# Patient Record
Sex: FEMALE | Race: WHITE | NOT HISPANIC OR LATINO | Employment: UNEMPLOYED
[De-identification: names, ages, dates, MRNs, and addresses within clinical notes are randomized per-mention and may not be internally consistent; named-entity substitution may affect disease eponyms.]

---

## 2020-07-16 ENCOUNTER — TRANSCRIBE ORDERS (OUTPATIENT)
Dept: OTHER | Age: 55
End: 2020-07-16

## 2020-07-16 DIAGNOSIS — K76.0 NAFLD (NONALCOHOLIC FATTY LIVER DISEASE): Primary | ICD-10-CM

## 2020-08-04 NOTE — TELEPHONE ENCOUNTER
RECORDS RECEIVED FROM: for NAFLD (nonalcoholic fatty liver disease   Appt Date: 08.12.2020   NOTES STATUS DETAILS   OFFICE NOTE from referring provider Care Everywhere 07.09.2020 Jessica Arango MD     OFFICE NOTES from other specialists N/A    DISCHARGE SUMMARY from hospital N/A    MEDICATION LIST Care Everywhere    LIVER BIOSPY (IF APPLICABLE)      PATHOLOGY REPORTS  N/A    IMAGING     ENDOSCOPY (IF AVAILABLE) N/A    COLONOSCOPY (IF AVAILABLE) N/A    ULTRASOUND LIVER Care Everywhere  09.16.2019  US Abd Limited   CT OF ABDOMEN Care Everywhere 09.16.2019  CT Abd Pelvis   MRI OF LIVER Care Everywhere  07.29.2020 MR Abd Liver   FIBROSCAN, US ELASTOGRAPHY, FIBROSIS SCAN, MR ELASTOGRAPHY N/A    LABS     HEPATIC PANEL (LIVER PANEL) Care Everywhere 09.16.2019   BASIC METABOLIC PANEL Care Everywhere 09.16.2019   COMPLETE METABOLIC PANEL Care Everywhere 07.16.2020   COMPLETE BLOOD COUNT (CBC) Care Everywhere 07.16.2020   INTERNATIONAL NORMALIZED RATIO (INR) N/A    HEPATITIS C ANTIBODY N/A    HEPATITIS C VIRAL LOAD/PCR N/A    HEPATITIS C GENOTYPE N/A    HEPATITIS B SURFACE ANTIGEN N/A    HEPATITIS B SURFACE ANTIBODY N/A    HEPATITIS B DNA QUANT LEVEL N/A    HEPATITIS B CORE ANTIBODY N/A      Action 08.04.2020 RM   Action Taken Called Gurinder to get images pushed called [phone] lv for images. Pending for images.    08.07.2020 Called Gurinder at [phone] lvm for images.    08.10.2020 Called Gurinder at [phone] lvm to get images pushed.    08.11.2020 All images received and resolved to chart.

## 2020-08-10 ENCOUNTER — DOCUMENTATION ONLY (OUTPATIENT)
Dept: CARE COORDINATION | Facility: CLINIC | Age: 55
End: 2020-08-10

## 2020-08-11 ENCOUNTER — TELEPHONE (OUTPATIENT)
Dept: GASTROENTEROLOGY | Facility: CLINIC | Age: 55
End: 2020-08-11

## 2020-08-12 ENCOUNTER — PRE VISIT (OUTPATIENT)
Dept: GASTROENTEROLOGY | Facility: CLINIC | Age: 55
End: 2020-08-12

## 2023-05-12 ENCOUNTER — TRANSFERRED RECORDS (OUTPATIENT)
Dept: HEALTH INFORMATION MANAGEMENT | Facility: CLINIC | Age: 58
End: 2023-05-12

## 2023-06-18 ENCOUNTER — APPOINTMENT (OUTPATIENT)
Dept: GENERAL RADIOLOGY | Facility: CLINIC | Age: 58
End: 2023-06-18
Attending: FAMILY MEDICINE
Payer: MEDICAID

## 2023-06-18 ENCOUNTER — APPOINTMENT (OUTPATIENT)
Dept: ULTRASOUND IMAGING | Facility: CLINIC | Age: 58
End: 2023-06-18
Attending: FAMILY MEDICINE
Payer: MEDICAID

## 2023-06-18 ENCOUNTER — HOSPITAL ENCOUNTER (EMERGENCY)
Facility: CLINIC | Age: 58
Discharge: HOME | End: 2023-06-18
Attending: FAMILY MEDICINE | Admitting: FAMILY MEDICINE
Payer: MEDICAID

## 2023-06-18 VITALS
SYSTOLIC BLOOD PRESSURE: 135 MMHG | HEART RATE: 67 BPM | DIASTOLIC BLOOD PRESSURE: 82 MMHG | OXYGEN SATURATION: 92 % | RESPIRATION RATE: 20 BRPM | TEMPERATURE: 98.8 F

## 2023-06-18 DIAGNOSIS — M79.605 PAIN OF LEFT LOWER EXTREMITY: ICD-10-CM

## 2023-06-18 PROBLEM — F32.A DEPRESSION: Status: ACTIVE | Noted: 2023-06-18

## 2023-06-18 PROBLEM — E11.9 TYPE 2 DIABETES MELLITUS WITHOUT COMPLICATION, WITHOUT LONG-TERM CURRENT USE OF INSULIN (H): Status: ACTIVE | Noted: 2020-11-09

## 2023-06-18 PROBLEM — J44.9 COPD (CHRONIC OBSTRUCTIVE PULMONARY DISEASE) (H): Status: ACTIVE | Noted: 2019-11-25

## 2023-06-18 PROBLEM — I10 ESSENTIAL HYPERTENSION: Status: ACTIVE | Noted: 2017-06-05

## 2023-06-18 PROBLEM — E78.2 MIXED HYPERLIPIDEMIA: Status: ACTIVE | Noted: 2020-11-09

## 2023-06-18 PROBLEM — M79.7 FIBROMYALGIA: Status: ACTIVE | Noted: 2017-09-14

## 2023-06-18 LAB
HOLD SPECIMEN: NORMAL

## 2023-06-18 PROCEDURE — 93971 EXTREMITY STUDY: CPT | Mod: LT

## 2023-06-18 PROCEDURE — 250N000011 HC RX IP 250 OP 636: Performed by: FAMILY MEDICINE

## 2023-06-18 PROCEDURE — 73502 X-RAY EXAM HIP UNI 2-3 VIEWS: CPT

## 2023-06-18 PROCEDURE — 99284 EMERGENCY DEPT VISIT MOD MDM: CPT | Performed by: FAMILY MEDICINE

## 2023-06-18 PROCEDURE — 96375 TX/PRO/DX INJ NEW DRUG ADDON: CPT | Performed by: FAMILY MEDICINE

## 2023-06-18 PROCEDURE — 73552 X-RAY EXAM OF FEMUR 2/>: CPT | Mod: LT

## 2023-06-18 PROCEDURE — 96374 THER/PROPH/DIAG INJ IV PUSH: CPT | Performed by: FAMILY MEDICINE

## 2023-06-18 PROCEDURE — 73562 X-RAY EXAM OF KNEE 3: CPT | Mod: LT

## 2023-06-18 PROCEDURE — 99285 EMERGENCY DEPT VISIT HI MDM: CPT | Mod: 25 | Performed by: FAMILY MEDICINE

## 2023-06-18 RX ORDER — OXYCODONE HYDROCHLORIDE 5 MG/1
5 TABLET ORAL EVERY 6 HOURS PRN
Qty: 12 TABLET | Refills: 0 | Status: SHIPPED | OUTPATIENT
Start: 2023-06-18 | End: 2023-06-21

## 2023-06-18 RX ORDER — KETOROLAC TROMETHAMINE 15 MG/ML
15 INJECTION, SOLUTION INTRAMUSCULAR; INTRAVENOUS ONCE
Status: COMPLETED | OUTPATIENT
Start: 2023-06-18 | End: 2023-06-18

## 2023-06-18 RX ORDER — HYDROMORPHONE HYDROCHLORIDE 1 MG/ML
0.5 INJECTION, SOLUTION INTRAMUSCULAR; INTRAVENOUS; SUBCUTANEOUS
Status: DISCONTINUED | OUTPATIENT
Start: 2023-06-18 | End: 2023-06-18 | Stop reason: HOSPADM

## 2023-06-18 RX ORDER — ONDANSETRON 2 MG/ML
4 INJECTION INTRAMUSCULAR; INTRAVENOUS ONCE
Status: COMPLETED | OUTPATIENT
Start: 2023-06-18 | End: 2023-06-18

## 2023-06-18 RX ADMIN — ONDANSETRON 4 MG: 2 INJECTION INTRAMUSCULAR; INTRAVENOUS at 08:24

## 2023-06-18 RX ADMIN — HYDROMORPHONE HYDROCHLORIDE 0.5 MG: 1 INJECTION, SOLUTION INTRAMUSCULAR; INTRAVENOUS; SUBCUTANEOUS at 08:25

## 2023-06-18 RX ADMIN — KETOROLAC TROMETHAMINE 15 MG: 15 INJECTION, SOLUTION INTRAMUSCULAR; INTRAVENOUS at 08:21

## 2023-06-18 ASSESSMENT — ACTIVITIES OF DAILY LIVING (ADL)
ADLS_ACUITY_SCORE: 35
ADLS_ACUITY_SCORE: 33
ADLS_ACUITY_SCORE: 35

## 2023-06-18 NOTE — ED TRIAGE NOTES
Pt presents for eval of left leg pain that started last night.  Pt states that it hurts from the back left side of her knee down to her foot but occasionally pain goes up in to her buttock. No blood thinners, no history of long travel.     Triage Assessment     Row Name 06/18/23 0697       Triage Assessment (Adult)    Airway WDL WDL       Respiratory WDL    Respiratory WDL WDL       Skin Circulation/Temperature WDL    Skin Circulation/Temperature WDL WDL       Cardiac WDL    Cardiac WDL WDL       Peripheral/Neurovascular WDL    Peripheral Neurovascular WDL WDL       Cognitive/Neuro/Behavioral WDL    Cognitive/Neuro/Behavioral WDL WDL

## 2023-06-18 NOTE — ED PROVIDER NOTES
History     Chief Complaint   Patient presents with     Leg Pain     HPI  Lyndsay Coleman is a 57 year old female, past medical history is significant for ADHD, COPD, depression, hypertension, fibromyalgia, PTSD, urolithiasis, mixed hyperlipidemia, obesity, type 2 diabetes, vitamin D deficiency, presents to the emergency department with concerns of excruciating left leg pain beginning around her knee that awoke her from sleep at 2:00 in the morning.  The patient can think of nothing that she has done to injure herself lately and denies any trauma lifting straining bending falling or almost falling.  She states that she tried ice Tylenol and ibuprofen before coming in with approximately 6 hours of symptom duration at this point.  No paresthesias.  It is difficult for her to identify exactly where the pain is coming from but it seems to have started in the knee and any movement at the knee or movement of the hip seems to make it worse and the pain radiates from the knee area down into the calf and then up into the hip area.  She has never had pain like this before.    Allergies:  Allergies   Allergen Reactions     Sulfa Antibiotics Hives       Problem List:    Patient Active Problem List    Diagnosis Date Noted     Depression 06/18/2023     Priority: Medium     Mixed hyperlipidemia 11/09/2020     Priority: Medium     Type 2 diabetes mellitus without complication, without long-term current use of insulin (H) 11/09/2020     Priority: Medium     COPD (chronic obstructive pulmonary disease) (H) 11/25/2019     Priority: Medium     Fibromyalgia 09/14/2017     Priority: Medium     Essential hypertension 06/05/2017     Priority: Medium     ADHD (attention deficit hyperactivity disorder) 09/19/2016     Priority: Medium     PTSD (post-traumatic stress disorder) 09/19/2016     Priority: Medium     Kidney stones 10/06/2014     Priority: Medium     Obesity 02/25/2014     Priority: Medium     Vitamin D deficiency 02/25/2014      Priority: Medium        Past Medical History:    No past medical history on file.    Past Surgical History:    No past surgical history on file.    Family History:    No family history on file.    Social History:  Marital Status:  Single [1]        Medications:    No current outpatient medications on file.        Review of Systems   All other systems reviewed and are negative.      Physical Exam   BP: (!) 161/92  Pulse: 73  Temp: 98.8  F (37.1  C)  Resp: 20  SpO2: 97 %      Physical Exam  Vitals and nursing note reviewed.   Constitutional:       General: She is in acute distress.      Appearance: Normal appearance. She is not ill-appearing.   Musculoskeletal:      Comments: No gross asymmetry to the lower extremities.  Comfortable with internal/external rotation, limited flexion and extension primarily pain in the leg biceps at the hip but any attempted movement at the knee beyond about 10 to 15 degrees of flexion she has exquisite discomfort and pulls away from the examiner.  On direct palpation about the knee there is no tenderness there is no tenderness to popliteal space, minimal calf tenderness, Homans' sign is positive.  Also minimal tenderness through the leg biceps.  No back tenderness on exam   Skin:     General: Skin is warm and dry.      Capillary Refill: Capillary refill takes less than 2 seconds.   Neurological:      General: No focal deficit present.      Mental Status: She is alert and oriented to person, place, and time.         ED Course     EXAM: XR KNEE LEFT 3 VIEWS  LOCATION: [facility]  DATE: 6/18/2023     INDICATION: Excruciating pain left knee, atraumatic.  COMPARISON: None.                                                                      IMPRESSION: Mild tricompartmental osteoarthrosis. Small effusion. There is no evidence of fracture or calcified intra-articular body.  EXAM: XR HIP LEFT 2-3 VIEWS  LOCATION: [facility]  DATE:  06/18/2023     INDICATION: Left thigh and hip pain, atraumatic.  COMPARISON: None.                                                                      IMPRESSION: Degenerative changes in the visualized spine. Mild osteoarthrosis of the right SI joint and left hip and pubic symphysis. There is no evidence of fracture or osteonecrosis.   EXAM: XR FEMUR LEFT 2 VIEWS  LOCATION: [facility]  DATE: 6/18/2023     INDICATION: Left thigh pain, atraumatic.  COMPARISON: None.                                                                      IMPRESSION: Mild osteoarthrosis of the left hip, the pubic symphysis and the left SI joint. There is no evidence of fracture or osteonecrosis. Mild tricompartmental osteoarthrosis in the knee. Moderate knee joint effusion.    Narrative & Impression  EXAM: ULTRASOUND LOWER EXTREMITY VENOUS DUPLEX LEFT  LOCATION: [facility]  DATE: 06/18/2023     INDICATION: Calf, popliteal space, thigh pain. Evaluate for Baker's cyst.  DVT.  COMPARISON: None available.  TECHNIQUE: Venous Duplex ultrasound of the left lower extremity with and without compression, augmentation and duplex. Color flow and spectral Doppler with waveform analysis performed.     FINDINGS: Exam includes the common femoral, femoral, popliteal, and contralateral common femoral veins as well as segmentally visualized deep calf veins and greater saphenous vein.      LEFT: No deep vein thrombosis. No superficial thrombophlebitis. No popliteal cyst.                                                                      IMPRESSION:  1.  No deep venous thrombosis in the left lower extremity.     All diagnostics were reviewed in the room with the patient and answered her questions.  She is more comfortable after the Toradol and Zofran.  I explained to the patient that the behavior of her discomfort is most consistent with musculoskeletal injury of some kind although she can recall  really nothing, there is some physical deconditioning present, I have recommended symptomatic supportive care with cold alternating with warm pack to the area of concern, rest, elevation, Tylenol/ibuprofen as needed and have given her a prescription for oxycodone for breakthrough pain.  If this is not improving over the course the next 3 to 5 days she should follow-up in clinic with her primary care provider.  Return to the emergency department if worse or changes.      Procedures                  No results found for this or any previous visit (from the past 24 hour(s)).    Medications   ondansetron (ZOFRAN) injection 4 mg (4 mg Intravenous $Given 6/18/23 0809)   ketorolac (TORADOL) injection 15 mg (15 mg Intravenous $Given 6/18/23 0807)       Assessments & Plan (with Medical Decision Making)   Assessments and plan with medical decision making at the time stamp above.    Disclaimer: This note consists of symbols derived from keyboarding, dictation and/or voice recognition software. As a result, there may be errors in the script that have gone undetected. Please consider this when interpreting information found in this chart.      I have reviewed the nursing notes.    I have reviewed the findings, diagnosis, plan and need for follow up with the patient.          Discharge Medication List as of 6/18/2023 10:49 AM      START taking these medications    Details   oxyCODONE (ROXICODONE) 5 MG tablet Take 1 tablet (5 mg) by mouth every 6 hours as needed for pain, Disp-12 tablet, R-0, E-Prescribe             Final diagnoses:   Pain of left lower extremity - Probable muscle strain       6/18/2023   [facility] EMERGENCY DEPT     Mo Wright MD  06/27/23 1939

## 2023-06-18 NOTE — DISCHARGE INSTRUCTIONS
Cold alternating with warm pack to the area of concern.  Rest elevation.  Tylenol/ibuprofen as needed.  You may use oxycodone for breakthrough pain.  If not improving with conservative treatment as described over the next 3 to 5 days please follow-up in clinic or return to the emergency department for further evaluation.

## 2023-07-14 ENCOUNTER — TRANSFERRED RECORDS (OUTPATIENT)
Dept: HEALTH INFORMATION MANAGEMENT | Facility: CLINIC | Age: 58
End: 2023-07-14
Payer: MEDICAID

## 2023-07-21 ENCOUNTER — MEDICAL CORRESPONDENCE (OUTPATIENT)
Dept: HEALTH INFORMATION MANAGEMENT | Facility: CLINIC | Age: 58
End: 2023-07-21
Payer: MEDICAID

## 2023-07-25 ENCOUNTER — TRANSCRIBE ORDERS (OUTPATIENT)
Dept: OTHER | Age: 58
End: 2023-07-25

## 2023-07-25 DIAGNOSIS — R90.89 ABNORMAL BRAIN MRI: Primary | ICD-10-CM

## 2023-07-26 ENCOUNTER — TELEPHONE (OUTPATIENT)
Dept: NEUROSURGERY | Facility: CLINIC | Age: 58
End: 2023-07-26
Payer: MEDICAID

## 2023-07-26 NOTE — TELEPHONE ENCOUNTER
Referred by Angy Calderón PA-C / [facility] [phone]   Fax [phone]   Please call to schedule your appointment             Order Questions    Question Answer   Preferred Location: [facility] Neurosurgery [facility]   Scheduling Instructions: Please call to schedule your appointment   My Clinical Question Is: abnormal brain MRI

## 2023-08-04 NOTE — TELEPHONE ENCOUNTER
Action 8/4/23 MV 11.22am   Action Taken Imaging request faxed to [facility]     Action 8/9/23 MV 10.18am   Action Taken Images resolved in PACS         RECORDS RECEIVED FROM: internal   REASON FOR VISIT: abnormal brain MRI    Date of Appt: 8/16/23   NOTES (FOR ALL VISITS) STATUS DETAILS   OFFICE NOTE from referring provider Care Everywhere Angy MEAD @ [facility]:  7/21/23 telephone encounter  8/11/22 12/23/21   OFFICE NOTE from other specialist Care Everywhere Dr Audrey Nava @ [facility]:  5/12/23  3/22/23   EMG Care Everywhere [facility]:  3/2/22   MEDICATION LIST Care Everywhere    IMAGING  (FOR ALL VISITS)     MRI (HEAD, NECK, SPINE) PACS [facility]:  MRI Brain 6/7/23   CT (HEAD, NECK, SPINE) PACS [facility]:  CTA Head 7/14/23  CT Head 10/21/16

## 2023-08-11 NOTE — PROGRESS NOTES
[facility]  Department of Neurosurgery    Name: Lyndsay Coleman  MRN: [id number]  : 1965  Referring provider: Angy Calderón     Reason for visit: Left superior frontal mass, new patient visit    Dear Dr. Arango,    It was a pleasure to see Ms. Coleman in the [facility] today as a new patient.  I have reviewed the referral notes and imaging and have summarized our visit here. As you recall, Ms. Coleman is a pleasant 57 year-old right-handed female who presented with episodic headaches that shoot up her had and go down her neck. This is different than tension headaches she has had in the past. She has had lyrica and occipital nerve blocks, as well as myofascial trigger point injections in the past. Her sister had headaches in the past and was found with a brain tumor, so her PCP arranged for a MRI given her concerns for a brain tumor. This showed a mass for which she is referred.  She had a CT scan from 2016 but does not recall why that was done.    Review of Systems:   Pertinent items are noted in HPI or as in patient entered ROS below, remainder of complete ROS is negative.     Medications:  vyvanse, cymbalta, wellbutrin , amlodipine, alberterol, atorvastatin, vitamin D, fluticasone, ibuprofen, metformin, metoprolol, montelukast, omeprazole, pregabalin, tizanidine     Allergies: Sulfa antibiotics      Past Medical History: PTSD, ADHD, COPD, hypertension, fibromyalgia, diabetes, hyperlipidemia, nephrolithiasis    Family History: sister with a brain tumor     Social History:  From [place], here with two children. Quit smoking 2 years ago. Prior has worked in farming.     Physical Exam:   General: No acute distress.   Head: No signs of trauma.    Eyes: Conjunctivae are normal.  Mouth/Throat: Oropharynx moist.  Neck: Normal range of motion.    Resp: No respiratory distress.   MSK: Moves all extremities.  No obvious  deformity.  Neuro: The patient is fully oriented and quite pleasant. Speech normal. Extraocular movements are intact without nystagmus. Facial sensation is intact in V1, V2, V3 distributions. Facial nerve function is normal, rated as a House Brackmann 1, without synkinesis.  Palate is symmetric. Shoulders are full strength. Tongue is midline. There is no pronator drift. Full strength in all extremities. Sensation intact throughout.   Psych: Normal mood and affect. Behavior is normal.      Imaging:  We reviewed the CTA and MRI results from June and July 2023. These demonstrate a hypodense left frontal lesion that is bright on T2 and heterogeneously enhances. There is no edema in the surrounding parenchyma and no diffusion restriction. There is an old CT from 2016 and it appears slightly larger now (1.6 x 1.6 to 2.1 x 2.2 cm).     Assessment:  Left superior frontal mass  2.   Headaches of unclear etiology    Plan:  We reviewed the patient's history, imaging, natural history and expected outcomes of conservative management and intervention.  It is not clear what the tumor is based on radiographic criteria, but a meningioma, venous vascular lesion, dermoid, glioma or T2 bright tumor such as a low grade chondroid tumor is a possibility.  Options include observation or surgical resection. I would not radiate this given the diagnostic ambiguity. I do not believe it is contributing to her headaches however, so the decision to treat should be based purely on its growth and her age.  We discussed the risks or surgery including bleeding, infection, neurologic injury, stroke, CSF leak, weakness, numbness, subtotal removal, recurrence, failure to obtain a diagnosis, and need for additional procedures.  We will reimage in 1-2 years with an updated MRI. If any concerning symptoms develop, she knows to contact us and we can repeat the MRI sooner. If the tumor continues to grow, we can readdress the role of surgery. She prefers to  manage the tumor conservatively at this time.  I encouraged Ms. Coleman to contact with any questions or concerns or if we may be of assistance in any way.      It was a pleasure to participate in the care of your patient. Please feel free to contact me at any point if I can be of any assistance for Ms. Coleman.    Sincerely,  Carter Galicia MD       45 minutes spent on the date of the encounter doing chart review, review of outside records, review of test results, interpretation of tests, patient visit, documentation and discussion with other provider(s)

## 2023-08-16 ENCOUNTER — PRE VISIT (OUTPATIENT)
Dept: NEUROSURGERY | Facility: CLINIC | Age: 58
End: 2023-08-16

## 2023-08-16 ENCOUNTER — OFFICE VISIT (OUTPATIENT)
Dept: NEUROSURGERY | Facility: CLINIC | Age: 58
End: 2023-08-16
Payer: MEDICAID

## 2023-08-16 VITALS
OXYGEN SATURATION: 97 % | RESPIRATION RATE: 16 BRPM | DIASTOLIC BLOOD PRESSURE: 80 MMHG | SYSTOLIC BLOOD PRESSURE: 123 MMHG | HEART RATE: 73 BPM

## 2023-08-16 DIAGNOSIS — D32.9 MENINGIOMA (H): Primary | ICD-10-CM

## 2023-08-16 DIAGNOSIS — R90.89 ABNORMAL BRAIN MRI: ICD-10-CM

## 2023-08-16 PROCEDURE — 99204 OFFICE O/P NEW MOD 45 MIN: CPT | Performed by: NEUROLOGICAL SURGERY

## 2023-08-16 RX ORDER — ALBUTEROL SULFATE 0.83 MG/ML
2.5 SOLUTION RESPIRATORY (INHALATION)
COMMUNITY
Start: 2023-03-22

## 2023-08-16 RX ORDER — FLUTICASONE PROPIONATE 50 MCG
SPRAY, SUSPENSION (ML) NASAL
COMMUNITY
Start: 2023-07-31

## 2023-08-16 RX ORDER — DULOXETIN HYDROCHLORIDE 60 MG/1
CAPSULE, DELAYED RELEASE ORAL
COMMUNITY
Start: 2023-07-31

## 2023-08-16 RX ORDER — LISINOPRIL 40 MG/1
TABLET ORAL
COMMUNITY
Start: 2023-07-31

## 2023-08-16 RX ORDER — METFORMIN HCL 500 MG
TABLET, EXTENDED RELEASE 24 HR ORAL
COMMUNITY

## 2023-08-16 RX ORDER — BUPROPION HYDROCHLORIDE 150 MG/1
TABLET ORAL
COMMUNITY
Start: 2023-07-31

## 2023-08-16 RX ORDER — ALBUTEROL SULFATE 90 UG/1
AEROSOL, METERED RESPIRATORY (INHALATION)
COMMUNITY
Start: 2023-07-31

## 2023-08-16 RX ORDER — AMLODIPINE BESYLATE 10 MG/1
TABLET ORAL
COMMUNITY

## 2023-08-16 RX ORDER — ATORVASTATIN CALCIUM 40 MG/1
TABLET, FILM COATED ORAL
COMMUNITY
Start: 2023-07-31

## 2023-08-16 RX ORDER — DEXAMETHASONE 4 MG/1
TABLET ORAL
COMMUNITY

## 2023-08-16 ASSESSMENT — PAIN SCALES - GENERAL: PAINLEVEL: NO PAIN (0)

## 2023-08-16 NOTE — PATIENT INSTRUCTIONS
You were seen today with Dr. Carter Galicia.     Next steps:  Follow up in 2 years with ALYCE Savage or Dr. Galicia.  MRI prior to visit.      How to Contact Us  Send a [employer] message to your provider.   Call the clinic - your call will be routed appropriately.   Neurosurgery Clinic: [phone]  ENT Clinic: [phone]   To speak directly to an RN Care Coordinator:  Xiomy RN: [phone]  Kimberly RN: [phone]    Note: We do our best to check voicemail frequently throughout the day and make every effort to return calls within 1-2 business days. For urgent matters, please use the general clinic phone numbers listed above.

## 2023-08-16 NOTE — Clinical Note
2023       RE: Lyndsay Coleman  [address]  [place] [address]     Dear Colleague,    Thank you for referring your patient, Lyndsay Coleman, to the [facility]UROS[facility]GERY [facility] [place] [facility]. Please see a copy of my visit note below.    [facility]  Department of Neurosurgery    Name: Lyndsay Coleman  MRN: [id number]  : 1965  Referring provider: Angy Calderón     Reason for visit: Left superior frontal mass, new patient visit    Dear Dr. Arango,    It was a pleasure to see Ms. Coleman in the [facility] today as a new patient.  I have reviewed the referral notes and imaging and have summarized our visit here. As you recall, Ms. Coleman is a pleasant 57 year-old right-handed female who presented with episodic headaches that shoot up her had and go down her neck. This is different than tension headaches she has had in the past. She has had lyrica and occipital nerve blocks, as well as myofascial trigger point injections in the past. Her sister had headaches in the past and was found with a brain tumor, so her PCP arranged for a MRI given her concerns for a brain tumor. This showed a mass for which she is referred.  She had a CT scan from 2016 but does not recall why that was done.    Review of Systems:   Pertinent items are noted in HPI or as in patient entered ROS below, remainder of complete ROS is negative.     Medications:  vyvanse, cymbalta, wellbutrin , amlodipine, alberterol, atorvastatin, vitamin D, fluticasone, ibuprofen, metformin, metoprolol, montelukast, omeprazole, pregabalin, tizanidine     Allergies: Sulfa antibiotics      Past Medical History: PTSD, ADHD, COPD, hypertension, fibromyalgia, diabetes, hyperlipidemia, nephrolithiasis    Family History: sister with a brain tumor     Social History:  From [place], here with two children. Quit smoking 2 years ago.  Prior has worked in farming.     Physical Exam:   General: No acute distress.   Head: No signs of trauma.    Eyes: Conjunctivae are normal.  Mouth/Throat: Oropharynx moist.  Neck: Normal range of motion.    Resp: No respiratory distress.   MSK: Moves all extremities.  No obvious deformity.  Neuro: The patient is fully oriented and quite pleasant. Speech normal. Extraocular movements are intact without nystagmus. Facial sensation is intact in V1, V2, V3 distributions. Facial nerve function is normal, rated as a House Brackmann 1, without synkinesis.  Palate is symmetric. Shoulders are full strength. Tongue is midline. There is no pronator drift. Full strength in all extremities. Sensation intact throughout.   Psych: Normal mood and affect. Behavior is normal.      Imaging:  We reviewed the CTA and MRI results from June and July 2023. These demonstrate a hypodense left frontal lesion that is bright on T2 and heterogeneously enhances. There is no edema in the surrounding parenchyma and no diffusion restriction. There is an old CT from 2016 and it appears slightly larger now (1.6 x 1.6 to 2.1 x 2.2 cm).     Assessment:  Left superior frontal mass  2.   Headaches of unclear etiology    Plan:  We reviewed the patient's history, imaging, natural history and expected outcomes of conservative management and intervention.  It is not clear what the tumor is based on radiographic criteria, but a meningioma, venous vascular lesion, dermoid, glioma or T2 bright tumor such as a low grade chondroid tumor is a possibility.  Options include observation or surgical resection. I would not radiate this given the diagnostic ambiguity. I do not believe it is contributing to her headaches however, so the decision to treat should be based purely on its growth and her young age.  We discussed the risks or surgery including bleeding, infection, neurologic injury, stroke, CSF leak, weakness, numbness, subtotal removal, recurrence, failure to  obtain a diagnosis, and need for additional procedures.  I encouraged Ms. Coleman to contact with any questions or concerns or if we may be of assistance in any way.      It was a pleasure to participate in the care of your patient. Please feel free to contact me at any point if I can be of any assistance for Ms. Coleman.    Sincerely,  Crater Galicia MD       *** minutes spent on the date of the encounter doing chart review, review of outside records, review of test results, interpretation of tests, patient visit, documentation and discussion with other provider(s)          Again, thank you for allowing me to participate in the care of your patient.      Sincerely,    Carter Galicia MD

## 2023-08-16 NOTE — LETTER
[facility]  [facility] NEUROSURGERY CLINIC [address]  3RD FLOOR  [facility] [address]  Phone: [phone]  Fax: [phone]          2023    RE:   Lyndsay Coleman  [address]  [place] [address]      Dear Colleague,    Thank you for referring your patient, Lyndsay Coleman, to the [facility]. Please see a copy of my visit note below.      [facility]  Department of Neurosurgery    Name: Lyndsay Coleman  MRN: [id number]  : 1965  Referring provider: Angy Calderón     Reason for visit: Left superior frontal mass, new patient visit    Dear Dr. Arango,    It was a pleasure to see Ms. Coleman in the [facility] today as a new patient.  I have reviewed the referral notes and imaging and have summarized our visit here. As you recall, Ms. Coleman is a pleasant 57 year-old right-handed female who presented with episodic headaches that shoot up her had and go down her neck. This is different than tension headaches she has had in the past. She has had lyrica and occipital nerve blocks, as well as myofascial trigger point injections in the past. Her sister had headaches in the past and was found with a brain tumor, so her PCP arranged for a MRI given her concerns for a brain tumor. This showed a mass for which she is referred.  She had a CT scan from 2016 but does not recall why that was done.    Review of Systems:   Pertinent items are noted in HPI or as in patient entered ROS below, remainder of complete ROS is negative.     Medications:  vyvanse, cymbalta, wellbutrin , amlodipine, alberterol, atorvastatin, vitamin D, fluticasone, ibuprofen, metformin, metoprolol, montelukast, omeprazole, pregabalin, tizanidine     Allergies: Sulfa antibiotics      Past Medical History: PTSD, ADHD, COPD, hypertension, fibromyalgia, diabetes,  hyperlipidemia, nephrolithiasis    Family History: sister with a brain tumor     Social History:  From [place], here with two children. Quit smoking 2 years ago. Prior has worked in farming.     Physical Exam:   General: No acute distress.   Head: No signs of trauma.    Eyes: Conjunctivae are normal.  Mouth/Throat: Oropharynx moist.  Neck: Normal range of motion.    Resp: No respiratory distress.   MSK: Moves all extremities.  No obvious deformity.  Neuro: The patient is fully oriented and quite pleasant. Speech normal. Extraocular movements are intact without nystagmus. Facial sensation is intact in V1, V2, V3 distributions. Facial nerve function is normal, rated as a House Brackmann 1, without synkinesis.  Palate is symmetric. Shoulders are full strength. Tongue is midline. There is no pronator drift. Full strength in all extremities. Sensation intact throughout.   Psych: Normal mood and affect. Behavior is normal.      Imaging:  We reviewed the CTA and MRI results from June and July 2023. These demonstrate a hypodense left frontal lesion that is bright on T2 and heterogeneously enhances. There is no edema in the surrounding parenchyma and no diffusion restriction. There is an old CT from 2016 and it appears slightly larger now (1.6 x 1.6 to 2.1 x 2.2 cm).     Assessment:  Left superior frontal mass  2.   Headaches of unclear etiology    Plan:  We reviewed the patient's history, imaging, natural history and expected outcomes of conservative management and intervention.  It is not clear what the tumor is based on radiographic criteria, but a meningioma, venous vascular lesion, dermoid, glioma or T2 bright tumor such as a low grade chondroid tumor is a possibility.  Options include observation or surgical resection. I would not radiate this given the diagnostic ambiguity. I do not believe it is contributing to her headaches however, so the decision to treat should be based purely on its growth and her age.  We discussed  the risks or surgery including bleeding, infection, neurologic injury, stroke, CSF leak, weakness, numbness, subtotal removal, recurrence, failure to obtain a diagnosis, and need for additional procedures.  We will reimage in 1-2 years with an updated MRI. If any concerning symptoms develop, she knows to contact us and we can repeat the MRI sooner. If the tumor continues to grow, we can readdress the role of surgery. She prefers to manage the tumor conservatively at this time.  I encouraged Ms. Coleman to contact with any questions or concerns or if we may be of assistance in any way.      It was a pleasure to participate in the care of your patient. Please feel free to contact me at any point if I can be of any assistance for Ms. Coleman.    Sincerely,  Carter Galicia MD       45 minutes spent on the date of the encounter doing chart review, review of outside records, review of test results, interpretation of tests, patient visit, documentation and discussion with other provider(s)          Again, thank you for allowing me to participate in the care of your patient.      Sincerely,    Carter Galicia MD

## 2023-09-06 ENCOUNTER — NURSE TRIAGE (OUTPATIENT)
Dept: NURSING | Facility: CLINIC | Age: 58
End: 2023-09-06
Payer: MEDICAID

## 2023-09-06 ENCOUNTER — TELEPHONE (OUTPATIENT)
Dept: NEUROSURGERY | Facility: CLINIC | Age: 58
End: 2023-09-06
Payer: MEDICAID

## 2023-09-06 NOTE — TELEPHONE ENCOUNTER
Writer routed message to the Neurosurgery nurses pool    ATTN: Xiomy Yeung RNCC for Dr. Grayson Jackson,LPN  Neurosurgery nurse navigator.

## 2023-09-06 NOTE — TELEPHONE ENCOUNTER
Discussed with patient, provided reassurance. She verbalized understanding and will follow up with primary care.

## 2023-09-06 NOTE — TELEPHONE ENCOUNTER
"    Nurse Triage SBAR    Is this a 2nd Level Triage? YES, LICENSED PRACTITIONER REVIEW IS REQUIRED    Situation:   Patient with this ongoing pain to the left temple, eye and cheek area.  Increasing in frequency.    #1   could this be happening because of the tumor found on CT?    Background:   Ongoing  pain in the head, straight thru her left eye and along side of left eye and side of face, cheek are.  Throbbing numbness to this area.  Pain comes and goes.  Dull throb 2/10, which is relieved by Tylenol.  Then she has these \" lightening bolt\"  jolting pain which occur at the same locations  8/10.  Per pt report.  Her smile is equal, her eyebrows raise equal, her tongue is slightly to the left maybe, She is able to touch her nose with her index fingers with her arms out and eyes closed.  Face looks pretty equal.  Vision  She wears bifocals.  She denies double vision or blurred vision.  She is able to see the clock on the wall, read a book and mess with her phone.  She does have a history of floaters.  She states that her vision is shadowed \"dimming\" around the outside of her visual field.  But denies black areas in visual field.  She does have left ear pain.  She denies ringing or swooshing or buzzing to the ear.  She states that she did see an ENT for this previously.  No drainage to ear.   She does get some sort of a sore, crusty to her ear, cleans it, it goes away and may come back at times.    CT Angio Head done on  07-  Tumor to the left side  - see report            Protocol Recommended Disposition:   RN routing to Neuro    Recommendation:   If pain comes back and is unbearable, go to ED  IF any visual changes occur, go to the ED  If any weakness to extremities occur  go to ED  If any breathing issues  go to ED  If pain  to ear is uncontrolled,  go to ED  Anything changes  go to ED      Routed to provider          Reason for Disposition   Patient wants to be seen    Additional Information   Negative: " "Shock suspected (e.g., cold/pale/clammy skin, too weak to stand, low BP, rapid pulse)   Negative: Similar pain previously and it was from 'heart attack'   Negative: Similar pain previously and it was from 'angina' and not relieved by nitroglycerin   Negative: Sounds like a life-threatening emergency to the triager   Negative: Difficulty breathing or unusual sweating (e.g., sweating without exertion)   Negative: Can't close the mouth fully (i.e., \"mouth is locked open\", patient will have difficulty talking)   Negative: Fever and localized red rash   Negative: Fever and area of face is swollen   Negative: New-onset jaw pain of unknown cause AND at least one cardiac risk factor (e.g., 45 years or older, diabetes, high cholesterol, hypertension, obesity, smoker or strong family history of heart disease)   Negative: Patient sounds very sick or weak to the triager   Negative: SEVERE pain (e.g., excruciating, unable to do any normal activities)   Negative: Localized red rash and painful to the touch   Negative: Painful rash with multiple small blisters grouped together (i.e., dermatomal distribution or 'band' or 'stripe')   Negative: Swollen area of face and toothache   Negative: Swollen area of face that is painful to touch   Negative: Swelling around the eye   Negative: Fever present > 3 days (72 hours)   Negative: Looks like a boil or infected sore    Answer Assessment - Initial Assessment Questions  1. ONSET: \"When did the pain start?\" (e.g., minutes, hours, days)      6 months ago started,  increasing in frequency,    locallized around left temple, down to left eye area and left cheek.    Throbbing numbness.  2. ONSET: \"Does the pain come and go, or has it been constant since it started?\" (e.g., constant, intermittent, fleeting)      Comes and goes  3. SEVERITY: \"How bad is the pain?\"   (Scale 1-10; mild, moderate or severe)    - MILD (1-3): doesn't interfere with normal activities     - MODERATE (4-7): interferes " "with normal activities or awakens from sleep     - SEVERE (8-10): excruciating pain, unable to do any normal activities       When flares, steady dull throbbing, takes Tylenol for it and takes it away.  2/10.  Then when she gets a \"jolt,  to that same area  8/10  .    4. LOCATION: \"Where does it hurt?\"       Left temple, to left eye to left cheek area  5. RASH: \"Is there any redness, rash, or swelling of the face?\"      No redness or rash.  Or swelling  6. FEVER: \"Do you have a fever?\" If Yes, ask: \"What is it, how was it measured, and when did it start?\"       no  7. OTHER SYMPTOMS: \"Do you have any other symptoms?\" (e.g., fever, toothache, nasal discharge, nasal congestion, clicking sensation in jaw joint)      No fever, no toothache,  no nasal discharge, no nasal congestion,  Ear pain, left  .  Crusty sores in the ears, clean it and then it will come back,  it is currently gone.  She has been seen by ENT,   Denies ringing or swooshing .    8. PREGNANCY: \"Is there any chance you are pregnant?\" \"When was your last menstrual period?\"      no    Protocols used: Face Pain-A-[facility]    "

## 2023-09-06 NOTE — TELEPHONE ENCOUNTER
"[facility] Call Center    Phone Message    May a detailed message be left on voicemail: yes     Reason for Call: Symptoms or Concerns     If patient has red-flag symptoms, warm transfer to triage line    Current symptom or concern: Left pain in eye and head, pain in the left side of face tingling and numbness     Symptoms have been present for:  6-12 month(s)    Has patient previously been seen for this? No    By Dr. Galicia     Date: 9/6/23    Are there any new or worsening symptoms? No, Patient states that she forgot to mention in her visit with Dr. Galicia that she experiences increased left eye pain with numbness, pain in head around her temple and tingling on her left side of her face. She explains the pain as a \"lightning bolt\" On a scale of 1-10 her pain is a 3/4. Patient states that there is no vision loss when this happens. Patient experiences this daily, and all pain with come at once. Patient is wanting to know if this could be caused from her tumor or what this could be. Patient is currently experiencing this pain right now. Patient was transferred to [facility]. Please call Pt to discuss.    Action Taken: Message routed to:  [facility] ([facility] Neurosurgery    Travel Screening: Not Applicable                                                                   "

## 2023-09-06 NOTE — TELEPHONE ENCOUNTER
Discussed with Dr. Galicia. Symptoms are unlikely to be related to tumor. Recommend follow up with PCP. Left message for patient to call back to discuss.

## 2024-09-13 ENCOUNTER — TELEPHONE (OUTPATIENT)
Dept: NEUROSURGERY | Facility: CLINIC | Age: 59
End: 2024-09-13
Payer: MEDICAID

## 2024-09-13 NOTE — TELEPHONE ENCOUNTER
Left Voicemail (1st Attempt) for the patient to call back and schedule the following:    Location: [facility] [place] [place] [facility]  Provider: Grayson Camarillo  Appointment type: Return vascular  Appointment mode: virtual / in person  Return date: 8/16/25    Specialty phone number: [phone]    Is Imaging Needed: Yes - MRI  Imaging Phone Number to provide to patient: [phone]    Additional Notes: MRI prior to clinic appt

## 2024-09-14 ENCOUNTER — TELEPHONE (OUTPATIENT)
Dept: NEUROSURGERY | Facility: CLINIC | Age: 59
End: 2024-09-14
Payer: MEDICAID

## 2024-09-14 NOTE — TELEPHONE ENCOUNTER
Called patient and scheduled appointment w/ Pat Camarillo via task. Need order for MRI. Sent message back to Stacy Castelan for order. -KB

## 2024-09-16 ENCOUNTER — TELEPHONE (OUTPATIENT)
Dept: NEUROSURGERY | Facility: CLINIC | Age: 59
End: 2024-09-16
Payer: MEDICAID

## 2024-09-16 ENCOUNTER — TRANSCRIBE ORDERS (OUTPATIENT)
Dept: NEUROSURGERY | Facility: CLINIC | Age: 59
End: 2024-09-16
Payer: MEDICAID

## 2024-09-16 DIAGNOSIS — D32.9 MENINGIOMA (H): Primary | ICD-10-CM

## 2025-08-04 ENCOUNTER — HOSPITAL ENCOUNTER (OUTPATIENT)
Dept: MRI IMAGING | Facility: CLINIC | Age: 60
Discharge: HOME | End: 2025-08-04
Attending: NEUROLOGICAL SURGERY | Admitting: NEUROLOGICAL SURGERY
Payer: COMMERCIAL

## 2025-08-04 DIAGNOSIS — D32.9 MENINGIOMA (H): ICD-10-CM

## 2025-08-04 PROCEDURE — 70553 MRI BRAIN STEM W/O & W/DYE: CPT | Mod: 26 | Performed by: RADIOLOGY

## 2025-08-04 PROCEDURE — 255N000002 HC RX 255 OP 636: Performed by: STUDENT IN AN ORGANIZED HEALTH CARE EDUCATION/TRAINING PROGRAM

## 2025-08-04 PROCEDURE — 70553 MRI BRAIN STEM W/O & W/DYE: CPT

## 2025-08-04 PROCEDURE — A9585 GADOBUTROL INJECTION: HCPCS | Performed by: STUDENT IN AN ORGANIZED HEALTH CARE EDUCATION/TRAINING PROGRAM

## 2025-08-04 RX ORDER — GADOBUTROL 604.72 MG/ML
10 INJECTION INTRAVENOUS ONCE
Status: COMPLETED | OUTPATIENT
Start: 2025-08-04 | End: 2025-08-04

## 2025-08-04 RX ADMIN — GADOBUTROL 10 ML: 604.72 INJECTION INTRAVENOUS at 13:08

## 2025-08-20 ENCOUNTER — OFFICE VISIT (OUTPATIENT)
Dept: NEUROSURGERY | Facility: CLINIC | Age: 60
End: 2025-08-20
Payer: COMMERCIAL

## 2025-08-20 VITALS
RESPIRATION RATE: 16 BRPM | DIASTOLIC BLOOD PRESSURE: 84 MMHG | HEART RATE: 72 BPM | SYSTOLIC BLOOD PRESSURE: 136 MMHG | OXYGEN SATURATION: 92 %

## 2025-08-20 DIAGNOSIS — D32.9 MENINGIOMA (H): Primary | ICD-10-CM
